# Patient Record
Sex: FEMALE | Race: BLACK OR AFRICAN AMERICAN | NOT HISPANIC OR LATINO | URBAN - METROPOLITAN AREA
[De-identification: names, ages, dates, MRNs, and addresses within clinical notes are randomized per-mention and may not be internally consistent; named-entity substitution may affect disease eponyms.]

---

## 2024-02-29 PROBLEM — D21.9 FIBROIDS: Status: ACTIVE | Noted: 2019-09-17

## 2024-02-29 PROBLEM — I26.99 PULMONARY EMBOLISM (MULTI): Status: ACTIVE | Noted: 2017-10-10

## 2024-02-29 PROBLEM — Q52.10 VAGINAL SEPTUM: Status: ACTIVE | Noted: 2020-12-08

## 2024-02-29 PROBLEM — I10 ESSENTIAL HYPERTENSION: Chronic | Status: ACTIVE | Noted: 2017-07-06

## 2024-02-29 PROBLEM — Q51.820: Status: ACTIVE | Noted: 2020-10-09

## 2024-02-29 PROBLEM — H61.899 LESION OF EAR CANAL: Status: ACTIVE | Noted: 2024-02-29

## 2024-02-29 PROBLEM — Z86.711 HISTORY OF PULMONARY EMBOLISM: Chronic | Status: ACTIVE | Noted: 2020-06-11

## 2024-02-29 PROBLEM — R52 PAIN: Status: ACTIVE | Noted: 2022-03-15

## 2024-02-29 PROBLEM — F43.20 ADJUSTMENT DISORDER: Chronic | Status: ACTIVE | Noted: 2022-03-07

## 2024-02-29 PROBLEM — Z90.5 SINGLE KIDNEY: Chronic | Status: ACTIVE | Noted: 2022-03-17

## 2024-02-29 PROBLEM — E44.0 MALNUTRITION OF MODERATE DEGREE (MULTI): Status: ACTIVE | Noted: 2019-01-27

## 2024-02-29 PROBLEM — D62 ACUTE BLOOD LOSS ANEMIA: Chronic | Status: ACTIVE | Noted: 2017-07-06

## 2024-02-29 PROBLEM — N87.1 CIN II (CERVICAL INTRAEPITHELIAL NEOPLASIA II): Chronic | Status: ACTIVE | Noted: 2021-05-11

## 2024-02-29 PROBLEM — E27.1: Chronic | Status: ACTIVE | Noted: 2017-08-24

## 2024-02-29 PROBLEM — E24.9 CUSHING'S SYNDROME (MULTI): Status: ACTIVE | Noted: 2017-10-10

## 2024-02-29 PROBLEM — C74.90: Chronic | Status: ACTIVE | Noted: 2020-11-06

## 2024-02-29 PROBLEM — C78.02 MALIGNANT NEOPLASM METASTATIC TO BOTH LUNGS (MULTI): Status: ACTIVE | Noted: 2018-10-10

## 2024-02-29 PROBLEM — F32.A DEPRESSION: Chronic | Status: ACTIVE | Noted: 2018-10-08

## 2024-02-29 PROBLEM — C78.01 MALIGNANT NEOPLASM METASTATIC TO BOTH LUNGS (MULTI): Status: ACTIVE | Noted: 2018-10-10

## 2024-02-29 PROBLEM — E87.1 HYPONATREMIA: Chronic | Status: ACTIVE | Noted: 2017-11-01

## 2024-02-29 RX ORDER — NAPROXEN 375 MG/1
375 TABLET ORAL EVERY 12 HOURS PRN
COMMUNITY
Start: 2016-07-24

## 2024-02-29 RX ORDER — CETIRIZINE HYDROCHLORIDE 10 MG/1
1 TABLET ORAL DAILY
COMMUNITY
Start: 2023-10-20

## 2024-02-29 RX ORDER — ACETAMINOPHEN 500 MG
1000 TABLET ORAL EVERY 6 HOURS PRN
COMMUNITY
Start: 2024-02-01

## 2024-02-29 RX ORDER — MULTIVITAMIN
1 TABLET ORAL DAILY
COMMUNITY

## 2024-03-04 ENCOUNTER — PATIENT OUTREACH (OUTPATIENT)
Dept: HEMATOLOGY/ONCOLOGY | Facility: HOSPITAL | Age: 39
End: 2024-03-04

## 2024-03-04 NOTE — PROGRESS NOTES
03/04/2024 12:36PM Patient is self referred to re-establish care here at . Her history goes back to 2017, when she was initially found to have an adrenal mass on imaging completed for c/o left flank pain to r/o kidney stone non contrast CT revealed the 10.8cm mass. She went on to have a left adrenalectomy/nephrectomy at [facility] on 08/03/2017, pathology revealed adrenal cortical carcinoma. She has been treated with Mitotane, etoposide, cisplatin and doxorubicin. Went on to have a resection of soft tissue tumor in November of 2020, have requested that pathology report will be  scanned into [facility] once received. All patients information aside from pathology reports are in [place]. She is transferring her care to  secondary to change in her insurance. Left patient a message to call me back to confirm appointment and answer any questions she may have. Mingo Alcantar RN

## 2024-03-11 ENCOUNTER — APPOINTMENT (OUTPATIENT)
Dept: PRIMARY CARE | Facility: CLINIC | Age: 39
End: 2024-03-11